# Patient Record
Sex: MALE | Race: WHITE | Employment: FULL TIME | URBAN - METROPOLITAN AREA
[De-identification: names, ages, dates, MRNs, and addresses within clinical notes are randomized per-mention and may not be internally consistent; named-entity substitution may affect disease eponyms.]

---

## 2022-12-07 ENCOUNTER — APPOINTMENT (OUTPATIENT)
Dept: CT IMAGING | Age: 37
End: 2022-12-07
Attending: EMERGENCY MEDICINE
Payer: COMMERCIAL

## 2022-12-07 ENCOUNTER — HOSPITAL ENCOUNTER (EMERGENCY)
Age: 37
Discharge: HOME | End: 2022-12-07
Attending: EMERGENCY MEDICINE
Payer: COMMERCIAL

## 2022-12-07 VITALS
WEIGHT: 230 LBS | RESPIRATION RATE: 20 BRPM | BODY MASS INDEX: 34.07 KG/M2 | HEIGHT: 69 IN | HEART RATE: 94 BPM | OXYGEN SATURATION: 98 % | TEMPERATURE: 98.3 F | SYSTOLIC BLOOD PRESSURE: 166 MMHG | DIASTOLIC BLOOD PRESSURE: 98 MMHG

## 2022-12-07 DIAGNOSIS — R55 VASOVAGAL SYNCOPE: Primary | ICD-10-CM

## 2022-12-07 DIAGNOSIS — S06.0XAA CONCUSSION WITH UNKNOWN LOSS OF CONSCIOUSNESS STATUS, INITIAL ENCOUNTER: ICD-10-CM

## 2022-12-07 DIAGNOSIS — K21.9 GASTROESOPHAGEAL REFLUX DISEASE WITHOUT ESOPHAGITIS: ICD-10-CM

## 2022-12-07 DIAGNOSIS — S00.03XA CONTUSION OF SCALP, INITIAL ENCOUNTER: ICD-10-CM

## 2022-12-07 PROCEDURE — 70450 CT HEAD/BRAIN W/O DYE: CPT

## 2022-12-07 PROCEDURE — 99284 EMERGENCY DEPT VISIT MOD MDM: CPT

## 2022-12-07 NOTE — Clinical Note
[address] EMERGENCY DEPARTMENT  [address] [facility] [address]  [phone]    Work/School Note    Date: 12/7/2022    To Whom It May concern:    Fabiola Reed III was seen and treated today in the emergency room by the following provider(s):  Attending Provider: Tristen Mancia MD.      Chema Mcmahon III is excused from work/school on 12/7/2022 through 12/9/2022. He is medically clear to return to work/school on 12/10/2022.          Sincerely,          Ricky Esquivel MD

## 2022-12-07 NOTE — Clinical Note
[address] EMERGENCY DEPARTMENT  [address] [facility] [place] [address] [phone]    Work/School Note    Date: 12/7/2022    To Whom It May concern:    Fabiola Reed III was seen and treated today in the emergency room by the following provider(s):  Attending Provider: Lamar Tiwari MD.      Shira Triana III is excused from work/school on 12/7/2022 through 12/9/2022. He is medically clear to return to work/school on 12/10/2022.          Sincerely,          Delia Alfonso MD [phone]

## 2022-12-08 NOTE — ED TRIAGE NOTES
Patient presents with syncope. Hit head on hardwood floor. States \"choked on spit\".  Spouse states patient LOC for \"couple seconds\"

## 2022-12-08 NOTE — ED PROVIDER NOTES
EMERGENCY DEPARTMENT HISTORY AND PHYSICAL EXAM      Date: 12/7/2022  Patient Name: Yessenia Layne III    History of Presenting Illness     Chief Complaint   Patient presents with    Syncope    Head Injury    Blurred Vision       History Provided By: Patient wife    HPI: Yessenia Layne III, 44 y.o. male   presents to the ED with cc of syncope. Patient stated he was sitting when he was choked on his own saliva and had a witnessed syncopal episode. Patient hit the back of his head when he fell. No seizure activity. Patient woke up without confusion but had blurred vision that resolved upon the arrival to the emergency room. No headache. No visual changes, paresthesia, or motor dysfunction. No neck pain. No chest pain or shortness of breath. Patient states that he has a history of GERD with occasional similar symptom where he chokes on saliva and regurgitation. Patient had a routine follow-up with his primary recently and was noted to have elevated blood pressure. Patient has a follow-up appointment for his blood pressure evaluation and treatment. PCP: Erma Bay MD    No current facility-administered medications on file prior to encounter. No current outpatient medications on file prior to encounter. Past History     Past Medical History:  Past Medical History:   Diagnosis Date    GERD (gastroesophageal reflux disease)     Hypertension        Past Surgical History:  No past surgical history on file. Family History:  No family history on file. Social History:  Social History     Tobacco Use    Smoking status: Every Day     Packs/day: 1.00     Types: Cigarettes    Smokeless tobacco: Never   Substance Use Topics    Alcohol use: Not Currently    Drug use: Not Currently       Allergies:  No Known Allergies      Review of Systems   Review of Systems   Constitutional:  Negative for chills and fever. HENT:  Negative for sore throat. Eyes:  Negative for visual disturbance. Respiratory:  Negative for cough and shortness of breath. Cardiovascular:  Negative for chest pain and leg swelling. Gastrointestinal:  Negative for abdominal pain and vomiting. Endocrine: Negative for polyuria. Genitourinary:  Negative for difficulty urinating and flank pain. Musculoskeletal:  Negative for back pain and neck pain. Skin:  Negative for rash. Neurological:  Negative for headaches. Hematological:  Does not bruise/bleed easily. Psychiatric/Behavioral:  Negative for suicidal ideas. All other systems reviewed and are negative. Physical Exam   Physical Exam  Vitals and nursing note reviewed. Constitutional:       General: He is not in acute distress. Appearance: He is well-developed. He is not ill-appearing, toxic-appearing or diaphoretic. HENT:      Head: Normocephalic and atraumatic. Comments: Except for hematoma on occipital scalp with a superficial skin abrasion     Nose: No congestion. Mouth/Throat:      Mouth: Mucous membranes are moist.   Eyes:      Extraocular Movements: Extraocular movements intact. Conjunctiva/sclera: Conjunctivae normal.      Pupils: Pupils are equal, round, and reactive to light. Cardiovascular:      Rate and Rhythm: Normal rate and regular rhythm. Pulmonary:      Effort: Pulmonary effort is normal.      Breath sounds: Normal breath sounds. Abdominal:      General: Bowel sounds are normal.      Palpations: Abdomen is soft. Tenderness: There is no abdominal tenderness. There is no guarding or rebound. Musculoskeletal:      Cervical back: Neck supple. No tenderness. Right lower leg: No edema. Left lower leg: No edema. Comments: Thoracic and lumbar midline nontender   Skin:     General: Skin is warm and dry. Neurological:      General: No focal deficit present. Mental Status: He is alert. Cranial Nerves: No cranial nerve deficit. Motor: No weakness.       Gait: Gait normal.   Psychiatric: Mood and Affect: Mood normal.         Behavior: Behavior normal.       Diagnostic Study Results     Labs -   No results found for this or any previous visit (from the past 12 hour(s)). Radiologic Studies -   CT HEAD WO CONT   Final Result   No acute intracranial hemorrhage, mass or infarct. CT Results  (Last 48 hours)                 12/07/22 2309  CT HEAD WO CONT Final result    Impression:  No acute intracranial hemorrhage, mass or infarct. Narrative:  INDICATION: head injury        Exam: Noncontrast CT of the brain is performed with 5 mm collimation. CT dose reduction was achieved with the use of the standardized protocol   tailored for this examination and automatic exposure control for dose   modulation. FINDINGS: There is no acute intracranial hemorrhage, mass, mass effect or   herniation. Ventricular system is normal. The gray-white matter differentiation   is well-preserved. The mastoid air cells are well pneumatized. The visualized   paranasal sinuses are normal.                 CXR Results  (Last 48 hours)      None              Medical Decision Making   I am the first provider for this patient. I reviewed the vital signs, available nursing notes, past medical history, past surgical history, family history and social history. Vital Signs-Reviewed the patient's vital signs. Patient Vitals for the past 12 hrs:   Temp Pulse Resp BP SpO2   12/07/22 2351 -- 94 20 (!) 166/98 --   12/07/22 2254 98.3 °F (36.8 °C) 95 18 (!) 171/96 98 %       Records Reviewed:     Provider Notes (Medical Decision Making):   Patient presents with symptoms signs consistent with a vasovagal reaction and syncope from choking. Patient states that he frequently had a similar choking from GERD. CT head is negative. I discussed with wife and patient regarding vasovagal syncope and concussion.   Patient was advised to follow-up with primary care for further evaluation of blood pressure and study of sleep apnea. Patient is advised to continue with his treatment of GERD. ED Course:   Initial assessment performed. The patients presenting problems have been discussed, and they are in agreement with the care plan formulated and outlined with them. I have encouraged them to ask questions as they arise throughout their visit. Neuro nonfocal.  No respiratory distress. Patient ambulates here without any difficulty. PROCEDURES      Disposition: Condition stable   DC- Adult Discharges: All of the diagnostic tests were reviewed and questions answered. Diagnosis, care plan and treatment options were discussed. understand instructions and will follow up as directed. The patients results have been reviewed with them. They have been counseled regarding their diagnosis. The patient verbally convey understanding and agreement of the signs, symptoms, diagnosis, treatment and prognosis and additionally agrees to follow up as recommended. They also agree with the care-plan and convey that all of their questions have been answered. I have also put together some discharge instructions for them that include: 1) educational information regarding their diagnosis, 2) how to care for their diagnosis at home, as well a 3) list of reasons why they would want to return to the ED prior to their follow-up appointment, should their condition change. PLAN:  1. There are no discharge medications for this patient. 2.   Follow-up Information       Follow up With Specialties Details Why Contact Info    Follow up with your primary care physician  Schedule an appointment as soon as possible for a visit in 3 days As needed           Return to ED if worse     Diagnosis     Clinical Impression:   1. Vasovagal syncope    2. Contusion of scalp, initial encounter    3. Concussion with unknown loss of consciousness status, initial encounter    4.  Gastroesophageal reflux disease without esophagitis        Please note that this dictation was completed with [employer], the [employer] voice recognition software. Quite often unanticipated grammatical, syntax, homophones, and other interpretive errors are inadvertently transcribed by the computer software. Please disregard these errors. Please excuse any errors that have escaped final proofreading. Thank you.

## 2022-12-08 NOTE — ED NOTES
Patient ambulating without assistance, steady. Respirations even and unlabored, speaking clearly in complete sentences.